# Patient Record
(demographics unavailable — no encounter records)

---

## 2025-03-17 NOTE — PHYSICAL EXAM
[General Appearance - Well Developed] : well developed [Normal Conjunctiva] : the conjunctiva exhibited no abnormalities [Normal Oropharynx] : normal oropharynx [Neck Appearance] : the appearance of the neck was normal [] : no respiratory distress [Respiration, Rhythm And Depth] : normal respiratory rhythm and effort [Exaggerated Use Of Accessory Muscles For Inspiration] : no accessory muscle use [Auscultation Breath Sounds / Voice Sounds] : lungs were clear to auscultation bilaterally [Abnormal Walk] : normal gait [Oriented To Time, Place, And Person] : oriented to person, place, and time [FreeTextEntry1] : irregular rhythm and regular rate

## 2025-03-17 NOTE — HISTORY OF PRESENT ILLNESS
[FreeTextEntry1] : 65-year-old male (BMI 31) with history of peripheral neuropathy, who presents for chief complaint of a-fib, rule out CECILIO. Sees Dr. Godinez for cardiology. Mentions some issues with allergies and psychosocial stressors affecting his sleep.  Sleeps at 1130 Wakes up at 730 Takes 30 min to go to sleep. Wakes up 1-2 times during sleep. Takes 0 naps. Any difference on the weekends? No Mentions weight gain during the winter time and nocturia x2. Denies excessive daytime somnolence, snoring, experienced apneas, or morning headaches. However, he sleeps independently. Denies RLS-like symptoms. Drinks occasionally ETOH. Never smoker Drinks 1-2 cup of coffee daily. Takes no medications for sleep. Have you seen someone else for this issue before? Yes Have you been diagnosed with sleep apnea before? Yes If so, when was the sleep study performed? 10 years ago x2 Had been told he had sleep apnea and tried CPAP for CECILIO, but he had "allergies" affecting him during that time. Repeat testing was negative. Had issues with the pressure. Denies eating late dinners or using screen time. ESS 0 [ESS] : 0

## 2025-03-17 NOTE — CONSULT LETTER
[Dear  ___] : Dear  [unfilled], [Consult Letter:] : I had the pleasure of evaluating your patient, [unfilled]. [Please see my note below.] : Please see my note below. [Consult Closing:] : Thank you very much for allowing me to participate in the care of this patient.  If you have any questions, please do not hesitate to contact me. [Sincerely,] : Sincerely, [FreeTextEntry3] : Judi Gonzales MD  [employer] at [facility] Pulmonary, Critical Care, and Sleep Medicine

## 2025-03-17 NOTE — ASSESSMENT
[FreeTextEntry1] : 65-year-old male with history of peripheral neuropathy, who presents for chief complaint of a-fib, rule out CECILIO.  #A-fib #Obesity with BMI 31 #Rule out CECILIO  Plan: Will evaluate for CECILIO with HST. Discussed WatchPAT details and instructions of how to use it. Discussed treatment options if CECILIO is diagnosed. The ramifications of CECILIO and its treatment were also addressed. Follow up after HST is resulted.

## 2025-04-18 NOTE — HISTORY OF PRESENT ILLNESS
[FreeTextEntry1] : 65-year-old male (BMI 31) with history of peripheral neuropathy, who presents for chief complaint of a-fib, rule out CECILIO. Sees Dr. Godinez for cardiology. Mentions some issues with allergies and psychosocial stressors affecting his sleep.  Sleeps at 1130 Wakes up at 730 Takes 30 min to go to sleep. Wakes up 1-2 times during sleep. Takes 0 naps. Any difference on the weekends? No Mentions weight gain during the winter time and nocturia x2. Denies excessive daytime somnolence, snoring, experienced apneas, or morning headaches. However, he sleeps independently. Denies RLS-like symptoms. Drinks occasionally ETOH. Never smoker Drinks 1-2 cup of coffee daily. Takes no medications for sleep. Have you seen someone else for this issue before? Yes Have you been diagnosed with sleep apnea before? Yes If so, when was the sleep study performed? 10 years ago x2 Had been told he had sleep apnea and tried CPAP for CECILIO, but he had "allergies" affecting him during that time. Repeat testing was negative. Had issues with the pressure. Denies eating late dinners or using screen time. ESS 0  4/18/2025 Had HST, here to discuss results.  [ESS] : 0

## 2025-04-18 NOTE — ASSESSMENT
[FreeTextEntry1] : Reviewed HST 4/13/2025: AHI 21 (4%); AHI 31 (3%); t88 0.1 minutes  65-year-old male with history of peripheral neuropathy, who presented for chief complaint of a-fib, rule out CECILIO. HST shows moderate to severe CECILIO. The benefits of CECILIO treatment in improving cardiac, neurologic, cognitive, and mortality outcomes were discussed. PAP, MAD, Zepbound discussed. MAD not recommended for severe CECILIO. He would like to pursue PAP therapy. Tried PAP (10 + years ago). Equipment ordered; DME is [facility]; will have mask fitting at time of ; APAP set 5 to 20 cm H2O. Adherence goal is at least 4 hours of use per night on 70% of nights with an AHI of less than 10 events per hour. The ramifications of CECILIO and its treatment were discussed in detail. Follow up 10 weeks after starting PAP.

## 2025-04-18 NOTE — CONSULT LETTER
[Dear  ___] : Dear  [unfilled], [Courtesy Letter:] : I had the pleasure of seeing your patient, [unfilled], in my office today. [Please see my note below.] : Please see my note below. [Consult Closing:] : Thank you very much for allowing me to participate in the care of this patient.  If you have any questions, please do not hesitate to contact me. [Sincerely,] : Sincerely, [FreeTextEntry3] : Judi Gonzales MD  [employer] at [facility] Pulmonary, Critical Care, and Sleep Medicine

## 2025-04-18 NOTE — REASON FOR VISIT
[Follow-Up] : a follow-up visit [Home] : at home, [unfilled] , at the time of the visit. [Medical Office: ([facility])___] : at the medical office located in  [Telehealth (audio & video)] : This visit was provided via telehealth using real-time 2-way audio visual technology. [Verbal consent obtained from patient] : the patient, [unfilled] [FreeTextEntry2] : HST results